# Patient Record
Sex: MALE | Race: OTHER | NOT HISPANIC OR LATINO | URBAN - METROPOLITAN AREA
[De-identification: names, ages, dates, MRNs, and addresses within clinical notes are randomized per-mention and may not be internally consistent; named-entity substitution may affect disease eponyms.]

---

## 2019-12-29 ENCOUNTER — EMERGENCY (EMERGENCY)
Facility: HOSPITAL | Age: 24
LOS: 1 days | Discharge: HOME | End: 2019-12-29
Attending: PERSONAL EMERGENCY RESPONSE ATTENDANT
Payer: COMMERCIAL

## 2019-12-29 VITALS
DIASTOLIC BLOOD PRESSURE: 82 MMHG | RESPIRATION RATE: 16 BRPM | HEART RATE: 90 BPM | WEIGHT: 220.02 LBS | HEIGHT: 67 IN | TEMPERATURE: 98 F | SYSTOLIC BLOOD PRESSURE: 136 MMHG | OXYGEN SATURATION: 95 %

## 2019-12-29 VITALS
OXYGEN SATURATION: 98 % | RESPIRATION RATE: 16 BRPM | SYSTOLIC BLOOD PRESSURE: 133 MMHG | TEMPERATURE: 98 F | DIASTOLIC BLOOD PRESSURE: 84 MMHG | HEART RATE: 98 BPM

## 2019-12-29 PROCEDURE — 99283 EMERGENCY DEPT VISIT LOW MDM: CPT

## 2019-12-29 RX ORDER — IBUPROFEN 200 MG
1 TABLET ORAL
Qty: 28 | Refills: 0
Start: 2019-12-29 | End: 2020-01-04

## 2019-12-29 RX ORDER — ACETAMINOPHEN 500 MG
650 TABLET ORAL ONCE
Refills: 0 | Status: COMPLETED | OUTPATIENT
Start: 2019-12-29 | End: 2019-12-29

## 2019-12-29 RX ADMIN — Medication 650 MILLIGRAM(S): at 15:31

## 2019-12-29 NOTE — ED PROVIDER NOTE - OBJECTIVE STATEMENT
25 y/o male with no PMH presents to the ED c/o cough. Patient reports body aches, cough, chills, and subjective fevers for the past 3 days. Has been using 1000mg tylenol and tried taking ibuprofen last night with some relief of symptoms. This morning reports he was very hot and sweaty and decided to the come to the ED. No CP, SOB, N/V/D, abdominal pain. Reports smoking 4 cigarettes a day.

## 2019-12-29 NOTE — ED PROVIDER NOTE - NS ED ROS FT
CONST: no fevers, + chills  EYES: no pain, no vision changes  ENT: no sore throat, no ear pain, no change in hearing  CV: no chest pain, no leg swelling  RESP: no shortness of breath, + cough  ABD: no abdominal pain, no nausea, no vomiting, no diarrhea  : no dysuria, no flank pain, no hematuria  MSK: no back pain, no extremity pain  NEURO: no headache or additional neurologic complaints  HEME: no easy bleeding  SKIN:  no rash

## 2019-12-29 NOTE — ED PROVIDER NOTE - NSFOLLOWUPINSTRUCTIONS_ED_ALL_ED_FT
1.  Take tylenol (650 mg by mouth) alternating with motrin (600 mg by mouth) every 3 hours.  (For ex.  Tylenol then 3 hours later motrin then 3 hours later tylenol etc.)  2.  Increase hydration of fluids (water, gatorade, broth, popsicles etc.).  Avoid alcohol while ill.  3.  Follow-up with primary care for reassessment in 2-3 days.  Also, please get the flu shot (if you have not already had one this season) at any pharmacy or from your primary care doctor once your symptoms resolve.   4.  Return to the ER should you develop high fevers greater than 103-104 or fevers unresponsive to tylenol/motrin, should you develop worsened shortness of breath or any respiratory distress including but not limited to inability to catch breath/inability to walk without marked shortness of breath or light-headedness, neck pain/stiffness, confusion, inability to tolerate oral intake or should you pass out.  5.  Please avoid any close or prolonged contact with other individuals, in particular: infants/elderly or individuals who are known to be immune supressed.

## 2019-12-29 NOTE — ED ADULT NURSE NOTE - OBJECTIVE STATEMENT
24 y M arrived to c/o headache and cough x2 days. Pt reports headache and fever started on Friday , pain is relieved with Motrin and tylenol; Cough is unproductive and he is currently having body aches and a sore throat. Pt denies chest pain, SOB, V/D, abdominal pain, urinary symptoms, hematuria, weakness, dizziness, numbness, tinging. Peripheral pulses present b/l. Skin warm, dry and pink.

## 2019-12-29 NOTE — ED PROVIDER NOTE - CLINICAL SUMMARY MEDICAL DECISION MAKING FREE TEXT BOX
23 y/o male presenting to the ED c/o cough and flu-like symptoms onset 3 days ago. Hx likely for viral syndrome. Patient with normal vitals and no acute exam findings. Will dose tylenol here and d/c home.

## 2019-12-29 NOTE — ED PROVIDER NOTE - PATIENT PORTAL LINK FT
You can access the FollowMyHealth Patient Portal offered by [facility] by registering at the following website: [email or web address]. By joining [employer]’s FollowMyHealth portal, you will also be able to view your health information using other applications (apps) compatible with our system.

## 2019-12-29 NOTE — ED PROVIDER NOTE - ATTENDING CONTRIBUTION TO CARE
Attending MD Gregory. Agree with above.  PT is a 25 yo male with no sig pmhx.  Pt presents with complaint of chills, body aches, cough, subjective fevers x 3 days.  Has been taking tylenol 1000 mg q4.  Took motrin last night and felt better but has not redosed.  No since that time has gone back to feeling body aches and cough again. No allergies, no recent travel.  Pt had a URI sev'l wks ago for several days but then improved.  Currently breath sounds are clear to auscultation to bilateral bases.  Pt smokes 4 cigarettes/day and has been counseled re: smoking cessation.  Denies any vaping of any kind ever.  Counseled re: likely back to back viral syndromes.      Pt reassured re: flu/likely flu dx.  Advised that though current sxs do not warrant admission for supportive care, there are specific sx changes that would warrant emergent return to ED.  Pt counseled that should they develop high fevers >103-104 or fevers unresponsive to tylenol/motrin, should they develop worsened SOB or any respiratory distress including but not limited to increased SOB/inability to catch breath/inability to ambulate without marked SOB, neck pain/stiffness, confusion, inability to tolerate PO or syncope they should return to ED.  As always, any CP/syncope/focal numbness/weakness or tingling, severe thunderclap headache all warrant emergent return as well.  Counseled to alternate tylenol and motrin (650 mg and 600 mg respectively) every 3 hrs for sxs control.  Increase hydration and PO of fluids.  Follow-up with PCP in 1-3 days to be reassessed.  Verbalizes understanding of above instructions and plan of care.  Stable for discharge at this time.

## 2021-10-17 ENCOUNTER — EMERGENCY (EMERGENCY)
Facility: HOSPITAL | Age: 26
LOS: 1 days | Discharge: HOME | End: 2021-10-17
Attending: STUDENT IN AN ORGANIZED HEALTH CARE EDUCATION/TRAINING PROGRAM
Payer: MEDICAID

## 2021-10-17 VITALS
SYSTOLIC BLOOD PRESSURE: 132 MMHG | DIASTOLIC BLOOD PRESSURE: 89 MMHG | OXYGEN SATURATION: 98 % | RESPIRATION RATE: 18 BRPM | TEMPERATURE: 98 F | HEART RATE: 65 BPM

## 2021-10-17 VITALS
RESPIRATION RATE: 16 BRPM | OXYGEN SATURATION: 97 % | HEART RATE: 68 BPM | DIASTOLIC BLOOD PRESSURE: 93 MMHG | HEIGHT: 67 IN | SYSTOLIC BLOOD PRESSURE: 137 MMHG | WEIGHT: 210.1 LBS | TEMPERATURE: 98 F

## 2021-10-17 PROCEDURE — 73130 X-RAY EXAM OF HAND: CPT

## 2021-10-17 PROCEDURE — 99283 EMERGENCY DEPT VISIT LOW MDM: CPT | Mod: 25

## 2021-10-17 PROCEDURE — 99283 EMERGENCY DEPT VISIT LOW MDM: CPT

## 2021-10-17 PROCEDURE — 73130 X-RAY EXAM OF HAND: CPT | Mod: 26,RT

## 2021-10-17 NOTE — ED ADULT NURSE NOTE - OBJECTIVE STATEMENT
Pt is a 26y M no sig PMH p/w R hand pain. Pt reports injuring his hand playing cricket yesterday. C/o pain in medial palmar aspect of R hand, 6/10 worse with movement. Denies numbness, tingling, pain elsewhere. A&Ox4, SAUCEDO, lungs clear, distal pulses intact, abdomen soft, skin intact. One side rail up for safety, call bell and personal items within reach, instructed to call for assistance, verbalizes understanding. Will continue to monitor.

## 2021-10-17 NOTE — ED PROVIDER NOTE - CLINICAL SUMMARY MEDICAL DECISION MAKING FREE TEXT BOX
27yo male p/w R hand pain. Brusing and swelling noted with mild TTP, no signs compartment syndrome. XR's negative for fx, likely contusion. Dc

## 2021-10-17 NOTE — ED PROVIDER NOTE - NSFOLLOWUPINSTRUCTIONS_ED_ALL_ED_FT
- Continue all regular medications  - For pain, take tylenol or ibuprofen as directed on the packaging  - Follow up with your primary doctor within 1 week  - You were given copies of labs and/or imaging results if applicable, please take them to your follow up appointments  - Return to the ER for severe pain or any worsening symptoms or concerns

## 2021-10-17 NOTE — ED PROVIDER NOTE - PATIENT PORTAL LINK FT
You can access the FollowMyHealth Patient Portal offered by [facility] by registering at the following website: [email or web address]. By joining [employer]’s FollowMyHealth portal, you will also be able to view your health information using other applications (apps) compatible with our system.

## 2021-10-17 NOTE — ED PROVIDER NOTE - OBJECTIVE STATEMENT
27yo male p/w R hand pain since playing cricket yesterday. Ball hit him in hand, noted some more swelling tonight. Denies numbness, weakness, other injuries. Declined pain medication.
yes

## 2021-10-17 NOTE — ED PROVIDER NOTE - PHYSICAL EXAMINATION
Physical Exam:  Gen: NAD, AOx3, non-toxic appearing, able to ambulate without assistance  Head: NCAT  HEENT: EOMI, PEERLA, normal conjunctiva, tongue midline, oral mucosa moist  Lung: speaking in full sentences  CV:  distal pulses 2+ b/l  MSK: mild medial palmar TTP, mild brusing and swelling noted, ROM normal in UE/LE, no back TTP  Neuro: No focal sensory or motor deficits  Skin: Warm, well perfused, no rash  Psych: normal affect, calm

## 2023-02-14 NOTE — ED ADULT TRIAGE NOTE - WEIGHT IN LBS
WOUND CARE AFTER SKIN BIOPSY    1.  Leave the bandage on for 24 hours.  Then you may get the biopsy site wet in the bath or shower- this makes removing the bandage easier.    2. After removing the bandage, gently cleanse the biopsy site 1-2 times daily  with soap and water, pat dry and apply vaseline and a new bandage to the area until it is healed.  Keeping the area covered will allow it to heal more quickly than if it is left open to the air. (If you were prescribed an antibiotic ointment (mupirocin), use this instead of the vaseline.)    3. Continue treating the biopsy site until fully healed.  This may take 2-3 weeks for head or neck sites.  This may take up to 3-6 weeks for trunk or extremity (arm or leg) sites.    4. If any bleeding occurs, hold FIRM pressure with clean tissue, gauze, or towel for 15 MINUTES. Do NOT release pressure to peek if the bleeding has stopped until 15 minutes have passed. Call our office if you are not able to stop the bleeding.    5. Call our office immediately if any signs of infection at the biopsy site (such as pain 1 inch beyond the procedure site, green or yellow drainage, or red streaks from the site), bleeding, excessive swelling.  A thin pink rim surrounding the site is normal.    6. You will be notified of the results by phone call, e-message, or letter (or at a recheck appointment).  If 2 weeks have passed since your procedure and you have not received the results, please call our office.    7. If you had sutures placed, they will need to be removed in 5-7 days for the face, 7-10 days for the scalp, and 10-14 days for the trunk or extremities (arms or legs).     If you have any concerns regarding the procedure or healing process, please contact [phone] for assistance.    210.1

## 2025-05-26 ENCOUNTER — EMERGENCY (EMERGENCY)
Facility: HOSPITAL | Age: 30
LOS: 1 days | End: 2025-05-26
Attending: STUDENT IN AN ORGANIZED HEALTH CARE EDUCATION/TRAINING PROGRAM
Payer: COMMERCIAL

## 2025-05-26 VITALS
SYSTOLIC BLOOD PRESSURE: 138 MMHG | TEMPERATURE: 98 F | OXYGEN SATURATION: 98 % | HEIGHT: 67 IN | RESPIRATION RATE: 18 BRPM | WEIGHT: 214.95 LBS | HEART RATE: 79 BPM | DIASTOLIC BLOOD PRESSURE: 96 MMHG

## 2025-05-26 PROCEDURE — 99284 EMERGENCY DEPT VISIT MOD MDM: CPT | Mod: 25

## 2025-05-27 VITALS
HEART RATE: 61 BPM | DIASTOLIC BLOOD PRESSURE: 74 MMHG | OXYGEN SATURATION: 98 % | RESPIRATION RATE: 18 BRPM | TEMPERATURE: 98 F | SYSTOLIC BLOOD PRESSURE: 139 MMHG

## 2025-05-27 PROBLEM — Z78.9 OTHER SPECIFIED HEALTH STATUS: Chronic | Status: ACTIVE | Noted: 2021-10-17

## 2025-05-27 PROCEDURE — 73610 X-RAY EXAM OF ANKLE: CPT | Mod: 26,RT

## 2025-05-27 PROCEDURE — 99284 EMERGENCY DEPT VISIT MOD MDM: CPT | Mod: 25

## 2025-05-27 PROCEDURE — 73630 X-RAY EXAM OF FOOT: CPT | Mod: 26,RT

## 2025-05-27 PROCEDURE — 73610 X-RAY EXAM OF ANKLE: CPT

## 2025-05-27 PROCEDURE — 73630 X-RAY EXAM OF FOOT: CPT

## 2025-05-27 RX ORDER — ACETAMINOPHEN 500 MG/5ML
975 LIQUID (ML) ORAL ONCE
Refills: 0 | Status: COMPLETED | OUTPATIENT
Start: 2025-05-27 | End: 2025-05-27

## 2025-05-27 RX ORDER — IBUPROFEN 200 MG
600 TABLET ORAL ONCE
Refills: 0 | Status: COMPLETED | OUTPATIENT
Start: 2025-05-27 | End: 2025-05-27

## 2025-05-27 RX ADMIN — Medication 600 MILLIGRAM(S): at 02:09

## 2025-05-27 RX ADMIN — Medication 975 MILLIGRAM(S): at 01:56

## 2025-05-27 NOTE — ED PROVIDER NOTE - PROGRESS NOTE DETAILS
Jess PGY3 - XR without acute fracture.  Likely ankle sprain.  Patient feeling improved after anti inflammatory medications.  Dispo to home with ortho follow up.

## 2025-05-27 NOTE — ED PROVIDER NOTE - CLINICAL SUMMARY MEDICAL DECISION MAKING FREE TEXT BOX
30-year-old male no PMH presents for right ankle pain after he took a misstep off of on a uneven concrete. Denies numbness, weakness, or any other complaints.    Physical Exam:  Gen: NAD, AOx3, non-toxic appearing  MSK: no visible deformities, ttp over the R lateral malleolus. intact sensation    Patient 30-year-old male no PMH presents for right ankle pain after he took a misstep off of on a uneven concrete. Denies numbness, weakness, or any other complaints.    Physical Exam:  Gen: NAD, AOx3, non-toxic appearing  MSK: no visible deformities, ttp over the R lateral malleolus. intact sensation    WIll obtain xrays to eval fx vs dislocation and likely DC.

## 2025-05-27 NOTE — ED PROVIDER NOTE - ATTENDING CONTRIBUTION TO CARE
Serafin Waite MD (Attending Physician):    I performed a history and physical exam of the patient and discussed their management with the resident/fellow/ACP/student. I have reviewed the resident/fellow/ACP/student note and agree with the documented findings and plan of care, except as noted. I have personally performed a substantive portion of the visit including all aspects of the medical decision making. My medical decision making and observations are found below. Please refer to any progress notes for updates on clinical course.    HPI:  30-year-old male with no sig PMH presents with right ankle pain after he took a misstep off of an uneven concrete. Says he twisted the right ankle, subsequently developed pain and swelling. Denies head strike. Not on AC. Denies numbness, weakness, or any other complaints. Endorsing pain with bearing weight on RLE.    PE:  Vitals noted  GEN - +In mild discomfort, A&Ox3  HEAD - NC/AT  EYES - PERRL, EOMI  ENT - Airway patent, mucous membranes moist  PULMONARY - Normal wob, CTA b/l, symmetric breath sounds, no W/R/R, satting 98% on RA  CARDIAC - +S1S2, RRR, no M/G/R, no JVD  EXTREMITIES - 2+ DP pulses b/l. Sensation intact in b/l LE. +Right ankle lateral malleolus TTP with mild swelling. +Decreased ROM of right ankle 2/2 to pain.  NEUROLOGIC - Alert, speech clear, moving all extremities spontaneously, no focal deficits  PSYCH - Normal mood/affect, normal insight    MDM:  DDx includes, but not limited to: right ankle fracture vs. dislocation vs. sprain. Pain meds, x-rays right ankle/foot. Dispo pending w/u.

## 2025-05-27 NOTE — ED PROVIDER NOTE - PATIENT PORTAL LINK FT
You can access the FollowMyHealth Patient Portal offered by [facility] by registering at the following website: [email or web address]. By joining [employer]’s FollowMyHealth portal, you will also be able to view your health information using other applications (apps) compatible with our system.

## 2025-05-27 NOTE — ED ADULT NURSE NOTE - OBJECTIVE STATEMENT
29 yo M AOx4 from home with PMH of HTN c/o right ankle pain. Pt reports walking on uneven pavement when "I head a pop". Pt endorsing pain while ambulating on right ankle. Pt initially presents to [facility] ED in no acute distress with unlabored breathing. Call bell in reach. Stretcher in lowest position locked with blanket given. Comfort care and safety measures provided. Pt denies c/p, sob, abd pain, N/V/D, fevers, chills, headache, dizziness, dysuria, blood in urine and stool.

## 2025-05-27 NOTE — ED PROVIDER NOTE - NSFOLLOWUPCLINICS_GEN_ALL_ED_FT
[facility] Orthopedic Surgery  Orthopedic Surgery  [address], [address]rd & [address]th [place] [address]  Phone: [phone]  Fax:   Follow Up Time: Routine    [facility] Sports Medicine  Sports Medicine  [address] [place] [address]  Phone: [phone]  Fax:   Follow Up Time: Routine

## 2025-05-27 NOTE — ED PROVIDER NOTE - NSFOLLOWUPINSTRUCTIONS_ED_ALL_ED_FT
Today in the emergency department you were evaluated for right ankle pain.  We did an x-ray that did not show any acute fracture of the bones in your foot.  You likely have an ankle sprain.  Please continue to use anti-inflammatory medications, ice, and Ace wrap as needed for your pain.    Please follow up with your primary care physician and/or an orthopedic surgeon within 1-2 weeks of discharge from the emergency department.   Please bring a copy of your results with you.  Please return to the emergency department for worsening of your symptoms.    You may take Acetaminophen over the counter as needed for pain and/or fever. Use as directed and see medication warnings.  You may take Ibuprofen over the counter as needed for pain and/or fever. Use as directed and see medication warnings.